# Patient Record
Sex: MALE | Race: OTHER | HISPANIC OR LATINO | URBAN - METROPOLITAN AREA
[De-identification: names, ages, dates, MRNs, and addresses within clinical notes are randomized per-mention and may not be internally consistent; named-entity substitution may affect disease eponyms.]

---

## 2019-10-13 ENCOUNTER — EMERGENCY (EMERGENCY)
Facility: HOSPITAL | Age: 34
LOS: 1 days | Discharge: HOME | End: 2019-10-13
Attending: STUDENT IN AN ORGANIZED HEALTH CARE EDUCATION/TRAINING PROGRAM | Admitting: STUDENT IN AN ORGANIZED HEALTH CARE EDUCATION/TRAINING PROGRAM
Payer: SELF-PAY

## 2019-10-13 VITALS
SYSTOLIC BLOOD PRESSURE: 157 MMHG | RESPIRATION RATE: 15 BRPM | DIASTOLIC BLOOD PRESSURE: 86 MMHG | TEMPERATURE: 98 F | OXYGEN SATURATION: 100 % | HEART RATE: 82 BPM

## 2019-10-13 PROCEDURE — 99053 MED SERV 10PM-8AM 24 HR FAC: CPT

## 2019-10-13 PROCEDURE — 99282 EMERGENCY DEPT VISIT SF MDM: CPT

## 2019-10-13 NOTE — ED ADULT NURSE NOTE - OBJECTIVE STATEMENT
pt on bed aox3, per EMS bystander called them found the pt. on the street, sleeping. AOB noted, claimed have drink 5 cans of beer. Denies HA dizziness SOB CP palpitation abdominal pain N V palpitation Denies PMHx, waiting for MD to eval will monitor

## 2019-10-13 NOTE — ED ADULT TRIAGE NOTE - CHIEF COMPLAINT QUOTE
alert intoxicated no distress was found sleeping in the trash  was drinking beer  no c/o pain or injury

## 2019-10-13 NOTE — ED PROVIDER NOTE - OBJECTIVE STATEMENT
Pt is a 32y/o M with no significant PMHx p/w intoxication  Pt was reportedly found sleeping in the trash and drinking beer. Pt states he was leaving a birthday party and waiting for his uber when the police questioned him. Then a police are and ambulance arrived and brought him to the ED. Pt states he is unsure why is at the hospital and denies any medical complaints. Endorses drinking 5-6 beers, but denies any illict drugs

## 2019-10-13 NOTE — ED ADULT NURSE NOTE - INTERVENTIONS DEFINITIONS
[place] to call system/Call bell, personal items and telephone within reach/Instruct patient to call for assistance

## 2019-10-13 NOTE — ED PROVIDER NOTE - CLINICAL SUMMARY MEDICAL DECISION MAKING FREE TEXT BOX
32y/o M with reportedly no PMHx arriving to the ED for intoxication. Endorsing having 5-6 beers. Currently AAOx3, denying and complaints 32y/o M with reportedly no PMHx arriving to the ED for intoxication. Endorsing having 5-6 beers. Currently AAOx3, denying any complaints. No signs of trauma on exam 34y/o M with reportedly no PMHx arriving to the ED for intoxication. Endorsing having 5-6 beers. Currently AAOx3, denying any complaints. No signs of trauma on exam. clinically sober. stable for dc.

## 2019-10-13 NOTE — ED PROVIDER NOTE - PATIENT PORTAL LINK FT
You can access the FollowMyHealth Patient Portal offered by [facility] by registering at the following website: [email or web address]. By joining [employer]’s FollowMyHealth portal, you will also be able to view your health information using other applications (apps) compatible with our system.

## 2019-10-13 NOTE — ED PROVIDER NOTE - ATTENDING CONTRIBUTION TO CARE
34 yo M no pmh occasional etoh use BIBEMS for etoh use. patient well appearing, aaox, ambulating with steady gait. no signs of traum or exam. patient clinically sober and stable for dc. patient advised to be cautious with future etoh use.

## 2021-03-10 ENCOUNTER — EMERGENCY (EMERGENCY)
Facility: HOSPITAL | Age: 36
LOS: 1 days | Discharge: HOME | End: 2021-03-10
Attending: EMERGENCY MEDICINE | Admitting: EMERGENCY MEDICINE
Payer: MEDICAID

## 2021-03-10 VITALS
TEMPERATURE: 97 F | DIASTOLIC BLOOD PRESSURE: 85 MMHG | OXYGEN SATURATION: 100 % | SYSTOLIC BLOOD PRESSURE: 136 MMHG | RESPIRATION RATE: 18 BRPM | HEART RATE: 84 BPM

## 2021-03-10 VITALS
DIASTOLIC BLOOD PRESSURE: 92 MMHG | HEART RATE: 86 BPM | OXYGEN SATURATION: 100 % | SYSTOLIC BLOOD PRESSURE: 141 MMHG | RESPIRATION RATE: 22 BRPM

## 2021-03-10 LAB
ANION GAP SERPL CALC-SCNC: 13 MMOL/L — SIGNIFICANT CHANGE UP (ref 7–14)
BASOPHILS # BLD AUTO: 0.05 K/UL — SIGNIFICANT CHANGE UP (ref 0–0.2)
BASOPHILS NFR BLD AUTO: 0.7 % — SIGNIFICANT CHANGE UP (ref 0–2)
BUN SERPL-MCNC: 19 MG/DL — SIGNIFICANT CHANGE UP (ref 7–23)
CALCIUM SERPL-MCNC: 9.3 MG/DL — SIGNIFICANT CHANGE UP (ref 8.4–10.5)
CHLORIDE SERPL-SCNC: 103 MMOL/L — SIGNIFICANT CHANGE UP (ref 98–107)
CO2 SERPL-SCNC: 26 MMOL/L — SIGNIFICANT CHANGE UP (ref 22–31)
CREAT SERPL-MCNC: 1.52 MG/DL — HIGH (ref 0.5–1.3)
EOSINOPHIL # BLD AUTO: 0.37 K/UL — SIGNIFICANT CHANGE UP (ref 0–0.5)
EOSINOPHIL NFR BLD AUTO: 5.3 % — SIGNIFICANT CHANGE UP (ref 0–6)
GLUCOSE SERPL-MCNC: 78 MG/DL — SIGNIFICANT CHANGE UP (ref 70–99)
HCT VFR BLD CALC: 46.9 % — SIGNIFICANT CHANGE UP (ref 39–50)
HGB BLD-MCNC: 15.2 G/DL — SIGNIFICANT CHANGE UP (ref 13–17)
IANC: 3.85 K/UL — SIGNIFICANT CHANGE UP (ref 1.5–8.5)
IMM GRANULOCYTES NFR BLD AUTO: 0.1 % — SIGNIFICANT CHANGE UP (ref 0–1.5)
LYMPHOCYTES # BLD AUTO: 2.04 K/UL — SIGNIFICANT CHANGE UP (ref 1–3.3)
LYMPHOCYTES # BLD AUTO: 29.1 % — SIGNIFICANT CHANGE UP (ref 13–44)
MCHC RBC-ENTMCNC: 28.1 PG — SIGNIFICANT CHANGE UP (ref 27–34)
MCHC RBC-ENTMCNC: 32.4 GM/DL — SIGNIFICANT CHANGE UP (ref 32–36)
MCV RBC AUTO: 86.9 FL — SIGNIFICANT CHANGE UP (ref 80–100)
MONOCYTES # BLD AUTO: 0.69 K/UL — SIGNIFICANT CHANGE UP (ref 0–0.9)
MONOCYTES NFR BLD AUTO: 9.8 % — SIGNIFICANT CHANGE UP (ref 2–14)
NEUTROPHILS # BLD AUTO: 3.85 K/UL — SIGNIFICANT CHANGE UP (ref 1.8–7.4)
NEUTROPHILS NFR BLD AUTO: 55 % — SIGNIFICANT CHANGE UP (ref 43–77)
NRBC # BLD: 0 /100 WBCS — SIGNIFICANT CHANGE UP
NRBC # FLD: 0 K/UL — SIGNIFICANT CHANGE UP
PLATELET # BLD AUTO: 203 K/UL — SIGNIFICANT CHANGE UP (ref 150–400)
POTASSIUM SERPL-MCNC: 3.9 MMOL/L — SIGNIFICANT CHANGE UP (ref 3.5–5.3)
POTASSIUM SERPL-SCNC: 3.9 MMOL/L — SIGNIFICANT CHANGE UP (ref 3.5–5.3)
RBC # BLD: 5.4 M/UL — SIGNIFICANT CHANGE UP (ref 4.2–5.8)
RBC # FLD: 12.7 % — SIGNIFICANT CHANGE UP (ref 10.3–14.5)
SODIUM SERPL-SCNC: 142 MMOL/L — SIGNIFICANT CHANGE UP (ref 135–145)
WBC # BLD: 7.01 K/UL — SIGNIFICANT CHANGE UP (ref 3.8–10.5)
WBC # FLD AUTO: 7.01 K/UL — SIGNIFICANT CHANGE UP (ref 3.8–10.5)

## 2021-03-10 PROCEDURE — 73590 X-RAY EXAM OF LOWER LEG: CPT | Mod: 26,RT

## 2021-03-10 PROCEDURE — 73564 X-RAY EXAM KNEE 4 OR MORE: CPT | Mod: 26,RT

## 2021-03-10 PROCEDURE — 73552 X-RAY EXAM OF FEMUR 2/>: CPT | Mod: 26,RT

## 2021-03-10 PROCEDURE — 99284 EMERGENCY DEPT VISIT MOD MDM: CPT

## 2021-03-10 RX ORDER — SODIUM CHLORIDE 9 MG/ML
1000 INJECTION INTRAMUSCULAR; INTRAVENOUS; SUBCUTANEOUS ONCE
Refills: 0 | Status: COMPLETED | OUTPATIENT
Start: 2021-03-10 | End: 2021-03-10

## 2021-03-10 RX ORDER — MORPHINE SULFATE 50 MG/1
4 CAPSULE, EXTENDED RELEASE ORAL ONCE
Refills: 0 | Status: DISCONTINUED | OUTPATIENT
Start: 2021-03-10 | End: 2021-03-10

## 2021-03-10 RX ADMIN — MORPHINE SULFATE 4 MILLIGRAM(S): 50 CAPSULE, EXTENDED RELEASE ORAL at 17:45

## 2021-03-10 RX ADMIN — SODIUM CHLORIDE 1000 MILLILITER(S): 9 INJECTION INTRAMUSCULAR; INTRAVENOUS; SUBCUTANEOUS at 17:45

## 2021-03-10 NOTE — ED PROVIDER NOTE - CLINICAL SUMMARY MEDICAL DECISION MAKING FREE TEXT BOX
36 y/o M s/p slip and fall w/ severe R knee pain. Will evaluate for fracture vs. dislocation vs. sprain. Will obtain CBC, BMP, give IV fluids and pain medications. Will x-ray R femur, knee and tib fib and reassess. 34 y/o M s/p slip and fall w/ severe R knee pain. Will evaluate for fracture vs. dislocation vs. sprain. Will obtain CBC, BMP, give IV fluids and pain medications. Will x-ray R femur, knee and tib fib and reassess.    XR demonstrate no acute findings. [facility] emailed regarding follow up. RX percocet sent. Placed in knee immobilizer. Crutches provided. Family member picking up. Return precautions provided.

## 2021-03-10 NOTE — ED PROVIDER NOTE - NSFOLLOWUPCLINICS_GEN_ALL_ED_FT
[facility] [place]  [facility]  .  [place]   Phone: [phone]  Fax:   Follow Up Time: 1-3 Days

## 2021-03-10 NOTE — ED PROVIDER NOTE - OBJECTIVE STATEMENT
34 y/o M s/p slip and fall on stairs w/ severe R knee pain. Denies numbness, tingling or weakness. Pt has not been able to ambulate or weight bare since fall. 36 y/o M s/p slip and fall on stairs w/ severe R knee pain. Denies numbness, tingling or weakness. Pt has not been able to ambulate or weight bear since fall.

## 2021-03-10 NOTE — ED PROVIDER NOTE - NSFOLLOWUPINSTRUCTIONS_ED_ALL_ED_FT
1) Massiel un seguimiento con la clínica ortopédica para jacob evaluación adicional.  2) Regrese al servicio de urgencias de inmediato si tiene síntomas nuevos o que empeoran, kings dolor de pecho, dificultad para respirar, náuseas / vómitos, mareos o fiebre.  3) Continúe tomando los medicamentos en casa según lo recetado. [place] percocet 325/5 mg cada 6 a 8 horas según sea necesario para el dolor. [place] ibuprofeno 600 mg cada 6 horas para aliviar el dolor / controlar la fiebre.  4) Los resultados de las pruebas de key visita al servicio de urgencias se analizaron con usted antes del vee  5) Se le proporcionó jacob copia de los resultados de key prueba.      1) Please follow up with Orthopedic clinic for further evaluation.  2) Return to the ED immediately for new or worsening symptoms including chest pain, shortness of breath, nausea/vomiting, dizziness or fever  3) Please continue to take any home medications as prescribed. Take percocet 325/5mg every 6-8 hours as needed for pain. Take ibuprofen 600 mg every 6 hours for pain relief/fever control  4) Your test results from your ED visit were discussed with you prior to discharge  5) You were provided with a copy of your test results

## 2021-03-10 NOTE — ED PROVIDER NOTE - PATIENT PORTAL LINK FT
You can access the FollowMyHealth Patient Portal offered by [facility] by registering at the following website: [email or web address]. By joining [employer]’s FollowMyHealth portal, you will also be able to view your health information using other applications (apps) compatible with our system.

## 2021-03-10 NOTE — ED ADULT TRIAGE NOTE - CHIEF COMPLAINT QUOTE
Pt s/p slip and fall while going up steps ,slipped and landed on right knee. Pt co severe pain unable to stand .

## 2021-03-10 NOTE — ED PROVIDER NOTE - LOWER EXTREMITY EXAM, RIGHT
R leg immobilized by EMS w/ long splint. No ROM of knee due to pain. There is no swelling, deformity or skin changes depreciated. Distally NVI. +TTP over the superior and medial aspects of the knee.

## 2021-03-10 NOTE — ED PROVIDER NOTE - HIV OFFER
Tell Mr. Flannery that Dr. Bauer said he could see him but at this point he would just be rechecking the blood work we did so I'd suggest we repeat just the CBC in six months.    Opt out

## 2021-03-11 PROBLEM — Z78.9 OTHER SPECIFIED HEALTH STATUS: Chronic | Status: ACTIVE | Noted: 2019-10-13

## 2021-03-17 PROBLEM — Z00.00 ENCOUNTER FOR PREVENTIVE HEALTH EXAMINATION: Status: ACTIVE | Noted: 2021-03-17

## 2021-03-24 ENCOUNTER — APPOINTMENT (OUTPATIENT)
Dept: ORTHOPEDIC SURGERY | Facility: HOSPITAL | Age: 36
End: 2021-03-24

## 2021-03-24 ENCOUNTER — OUTPATIENT (OUTPATIENT)
Dept: OUTPATIENT SERVICES | Facility: HOSPITAL | Age: 36
LOS: 1 days | End: 2021-03-24

## 2021-03-24 VITALS
BODY MASS INDEX: 36.3 KG/M2 | WEIGHT: 268 LBS | HEART RATE: 73 BPM | HEIGHT: 72 IN | SYSTOLIC BLOOD PRESSURE: 136 MMHG | DIASTOLIC BLOOD PRESSURE: 72 MMHG | TEMPERATURE: 97.7 F

## 2021-03-24 DIAGNOSIS — M25.561 PAIN IN RIGHT KNEE: ICD-10-CM

## 2021-03-29 DIAGNOSIS — M25.561 PAIN IN RIGHT KNEE: ICD-10-CM

## 2021-12-30 NOTE — ED PROVIDER NOTE - NEUROLOGICAL, MLM
Alert and oriented, no focal deficits, no motor or sensory deficits. Sski Pregnancy And Lactation Text: This medication is Pregnancy Category D and isn't considered safe during pregnancy. It is excreted in breast milk.

## 2023-04-27 NOTE — ED PROVIDER NOTE - CROS ED CARDIOVAS ALL NEG
[Normal Mood and Affect] : normal mood and affect [Orientated] : orientated [Able to Communicate] : able to communicate [Well Developed] : well developed [Well Nourished] : well nourished [] : full ROM with mild stiffness negative...